# Patient Record
Sex: MALE | Race: WHITE
[De-identification: names, ages, dates, MRNs, and addresses within clinical notes are randomized per-mention and may not be internally consistent; named-entity substitution may affect disease eponyms.]

---
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
## 2025-01-07 ENCOUNTER — HOSPITAL ENCOUNTER (OUTPATIENT)
Dept: HOSPITAL 53 - M WUC | Age: 20
End: 2025-01-07
Attending: STUDENT IN AN ORGANIZED HEALTH CARE EDUCATION/TRAINING PROGRAM
Payer: COMMERCIAL

## 2025-01-07 DIAGNOSIS — R05.9: Primary | ICD-10-CM

## 2025-01-07 DIAGNOSIS — R10.811: ICD-10-CM

## 2025-01-07 LAB
ALBUMIN SERPL BCG-MCNC: 4.2 G/DL (ref 3.2–5.2)
ALP SERPL-CCNC: 82 U/L (ref 40–129)
ALT SERPL W P-5'-P-CCNC: 68 U/L (ref 7–40)
AST SERPL-CCNC: 33 U/L (ref ?–34)
BASOPHILS # BLD AUTO: 0 10^3/UL (ref 0–0.2)
BASOPHILS NFR BLD AUTO: 0.3 % (ref 0–1)
BILIRUB SERPL-MCNC: 0.5 MG/DL (ref 0.3–1.2)
BUN SERPL-MCNC: 15 MG/DL (ref 9–23)
CALCIUM SERPL-MCNC: 10 MG/DL (ref 8.5–10.1)
CHLORIDE SERPL-SCNC: 105 MMOL/L (ref 98–107)
CO2 SERPL-SCNC: 28 MMOL/L (ref 20–31)
CREAT SERPL-MCNC: 1.1 MG/DL (ref 0.7–1.3)
EOSINOPHIL # BLD AUTO: 0.3 10^3/UL (ref 0–0.5)
EOSINOPHIL NFR BLD AUTO: 3.3 % (ref 0–3)
GFR SERPL CREATININE-BSD FRML MDRD: > 60 ML/MIN/{1.73_M2} (ref 60–?)
GLUCOSE SERPL-MCNC: 87 MG/DL (ref 60–100)
HCT VFR BLD AUTO: 46.6 % (ref 42–52)
HGB BLD-MCNC: 15.6 G/DL (ref 13.5–17.5)
LIPASE SERPL-CCNC: 38 U/L (ref 12–53)
LYMPHOCYTES # BLD AUTO: 2.6 10^3/UL (ref 1.5–5)
LYMPHOCYTES NFR BLD AUTO: 28 % (ref 24–44)
MCH RBC QN AUTO: 29.2 PG (ref 27–33)
MCHC RBC AUTO-ENTMCNC: 33.5 G/DL (ref 32–36.5)
MCV RBC AUTO: 87.1 FL (ref 80–96)
MONOCYTES # BLD AUTO: 1.2 10^3/UL (ref 0–0.8)
MONOCYTES NFR BLD AUTO: 12.8 % (ref 2–8)
NEUTROPHILS # BLD AUTO: 5.1 10^3/UL (ref 1.5–8.5)
NEUTROPHILS NFR BLD AUTO: 55.3 % (ref 36–66)
PLATELET # BLD AUTO: 243 10^3/UL (ref 150–450)
POTASSIUM SERPL-SCNC: 4.6 MMOL/L (ref 3.5–5.1)
PROT SERPL-MCNC: 7.5 G/DL (ref 5.7–8.2)
RBC # BLD AUTO: 5.35 10^6/UL (ref 4.3–6.1)
SODIUM SERPL-SCNC: 139 MMOL/L (ref 136–145)
WBC # BLD AUTO: 9.2 10^3/UL (ref 4–10)